# Patient Record
Sex: FEMALE | Race: WHITE | NOT HISPANIC OR LATINO | URBAN - NONMETROPOLITAN AREA
[De-identification: names, ages, dates, MRNs, and addresses within clinical notes are randomized per-mention and may not be internally consistent; named-entity substitution may affect disease eponyms.]

---

## 2018-06-08 ENCOUNTER — OFFICE VISIT (OUTPATIENT)
Dept: URBAN - NONMETROPOLITAN AREA CLINIC 10 | Facility: CLINIC | Age: 68
End: 2018-06-08
Payer: COMMERCIAL

## 2018-06-08 DIAGNOSIS — H25.13 AGE-RELATED NUCLEAR CATARACT, BILATERAL: ICD-10-CM

## 2018-06-08 DIAGNOSIS — H52.223 REGULAR ASTIGMATISM, BILATERAL: ICD-10-CM

## 2018-06-08 PROCEDURE — 92014 COMPRE OPH EXAM EST PT 1/>: CPT | Performed by: OPTOMETRIST

## 2018-06-08 ASSESSMENT — KERATOMETRY
OS: 43.88
OD: 44.00

## 2018-06-08 ASSESSMENT — VISUAL ACUITY
OD: 20/30
OS: 20/30

## 2018-06-08 ASSESSMENT — INTRAOCULAR PRESSURE
OS: 16
OD: 17

## 2018-06-08 NOTE — IMPRESSION/PLAN
Impression: Type 2 diabetes mellitus w/o complication: D55.7. Plan: Diabetes type II: no background retinopathy, no signs of neovascularization noted. Discussed ocular and systemic benefits of blood sugar control.

## 2018-06-08 NOTE — IMPRESSION/PLAN
Impression: Regular astigmatism, bilateral: H52.223. Plan: Diagnosis discussed. SRx released, pt edu that a temporary adjustment is expected. Continue to monitor.

## 2019-06-28 ENCOUNTER — OFFICE VISIT (OUTPATIENT)
Dept: URBAN - NONMETROPOLITAN AREA CLINIC 10 | Facility: CLINIC | Age: 69
End: 2019-06-28
Payer: COMMERCIAL

## 2019-06-28 PROCEDURE — 92014 COMPRE OPH EXAM EST PT 1/>: CPT | Performed by: OPTOMETRIST

## 2019-06-28 RX ORDER — HYDROCHLOROTHIAZIDE 25 MG/1
25 MG TABLET ORAL
Qty: 0 | Refills: 0 | Status: ACTIVE
Start: 2019-06-28

## 2019-06-28 ASSESSMENT — INTRAOCULAR PRESSURE
OS: 16
OD: 16

## 2019-06-28 NOTE — IMPRESSION/PLAN
Impression: Type 2 diabetes mellitus w/o complication: D72.7. Plan: Diabetes type II: no background retinopathy, no signs of neovascularization noted. Discussed ocular and systemic benefits of blood sugar control.

## 2019-07-01 ENCOUNTER — OFFICE VISIT (OUTPATIENT)
Dept: URBAN - NONMETROPOLITAN AREA CLINIC 10 | Facility: CLINIC | Age: 69
End: 2019-07-01
Payer: COMMERCIAL

## 2019-07-01 DIAGNOSIS — E11.9 TYPE 2 DIABETES MELLITUS W/O COMPLICATION: ICD-10-CM

## 2019-07-01 PROCEDURE — 92012 INTRM OPH EXAM EST PATIENT: CPT | Performed by: OPTOMETRIST

## 2019-07-01 ASSESSMENT — VISUAL ACUITY
OD: 20/40
OS: 20/40

## 2019-07-01 ASSESSMENT — KERATOMETRY
OD: 44.13
OS: 44.13

## 2019-07-01 ASSESSMENT — INTRAOCULAR PRESSURE
OD: 16
OS: 17

## 2019-07-01 NOTE — IMPRESSION/PLAN
Impression: Age-related nuclear cataract, bilateral: H25.13. Plan: Discussed diagnosis in detail with patient. No treatment is required at this time. Will continue to observe condition and or symptoms. Call if [address] worsens.

## 2020-07-13 ENCOUNTER — OFFICE VISIT (OUTPATIENT)
Dept: URBAN - NONMETROPOLITAN AREA CLINIC 10 | Facility: CLINIC | Age: 70
End: 2020-07-13
Payer: COMMERCIAL

## 2020-07-13 PROCEDURE — 99213 OFFICE O/P EST LOW 20 MIN: CPT | Performed by: OPTOMETRIST

## 2020-07-13 ASSESSMENT — KERATOMETRY
OS: 44.13
OD: 44.25

## 2020-07-13 ASSESSMENT — INTRAOCULAR PRESSURE
OS: 17
OD: 17

## 2020-07-13 ASSESSMENT — VISUAL ACUITY
OS: 20/40
OD: 20/30

## 2021-07-01 ENCOUNTER — OFFICE VISIT (OUTPATIENT)
Dept: URBAN - NONMETROPOLITAN AREA CLINIC 10 | Facility: CLINIC | Age: 71
End: 2021-07-01
Payer: COMMERCIAL

## 2021-07-01 PROCEDURE — 99204 OFFICE O/P NEW MOD 45 MIN: CPT | Performed by: OPHTHALMOLOGY

## 2021-07-01 ASSESSMENT — INTRAOCULAR PRESSURE
OD: 18
OS: 18

## 2021-07-01 ASSESSMENT — VISUAL ACUITY
OD: 20/40
OS: 20/40

## 2021-07-01 ASSESSMENT — KERATOMETRY
OD: 44.00
OS: 44.63

## 2021-07-01 NOTE — IMPRESSION/PLAN
Impression: Age-related nuclear cataract, bilateral: H25.13. Plan: Cataracts account for the patient's complaints. Surgery is recommended but patient is not noticing any vision changes. Patient is asymptomatic at this time. Patient may need to do surgery to see view of retina. The patient will monitor vision changes and contact us with any decrease in vision.

## 2021-07-01 NOTE — IMPRESSION/PLAN
Impression: Type 2 diabetes mellitus w/o complication: E70.6. Plan: Diabetes type II: no background retinopathy, no signs of neovascularization noted. Discussed ocular and systemic benefits of blood sugar control.

## 2022-03-03 ENCOUNTER — OFFICE VISIT (OUTPATIENT)
Dept: URBAN - NONMETROPOLITAN AREA CLINIC 10 | Facility: CLINIC | Age: 72
End: 2022-03-03
Payer: COMMERCIAL

## 2022-03-03 DIAGNOSIS — H43.813 VITREOUS DEGENERATION, BILATERAL: ICD-10-CM

## 2022-03-03 PROCEDURE — 92014 COMPRE OPH EXAM EST PT 1/>: CPT | Performed by: STUDENT IN AN ORGANIZED HEALTH CARE EDUCATION/TRAINING PROGRAM

## 2022-03-03 ASSESSMENT — INTRAOCULAR PRESSURE
OS: 19
OD: 19

## 2022-03-03 NOTE — IMPRESSION/PLAN
Impression: Age-related nuclear cataract, bilateral: H25.13. Plan: Patient educated on cataract as cause of reduced vision. Discussed surgical intervention versus monitoring with patient. Discussed that new glasses Rx will help only minimally with vision. Patient defers surgery at this time. Patient may call if vision decreases or changes significantly. Patient may also call for cataract consultation if they decide to elect surgical intervention. Will plan on monitoring semi-annually Pt ed extensively that she is not to drive at night without correction as she does not meet 20/40 benchmark.

## 2022-03-03 NOTE — IMPRESSION/PLAN
Impression: Type 2 diabetes mellitus without complications: N77.1. Plan: No background diabetic retinopathy, no signs of neovascularization noted. Counseled patient regarding ocular and systemic benefits of blood sugar control. Note to be sent to patient's PCP detailing findings.

## 2022-09-16 ENCOUNTER — OFFICE VISIT (OUTPATIENT)
Dept: URBAN - NONMETROPOLITAN AREA CLINIC 10 | Facility: CLINIC | Age: 72
End: 2022-09-16
Payer: COMMERCIAL

## 2022-09-16 DIAGNOSIS — H25.13 AGE-RELATED NUCLEAR CATARACT, BILATERAL: Primary | ICD-10-CM

## 2022-09-16 DIAGNOSIS — E11.3291 DIABETES MELLITUS TYPE 2 WITH MILD NON-PROLIFERATIVE RETINOPATHY WITHOUT MACULAR EDEMA, RIGHT EYE: ICD-10-CM

## 2022-09-16 DIAGNOSIS — E11.9 TYPE 2 DIABETES MELLITUS W/O COMPLICATION: ICD-10-CM

## 2022-09-16 PROCEDURE — 99214 OFFICE O/P EST MOD 30 MIN: CPT | Performed by: OPHTHALMOLOGY

## 2022-09-16 ASSESSMENT — INTRAOCULAR PRESSURE
OD: 18
OS: 18

## 2022-09-16 NOTE — IMPRESSION/PLAN
Impression: Diabetes mellitus Type 2 with mild non-proliferative retinopathy without macular edema, right eye: Q03.8591. Plan: Diabetes type II: mild background diabetic retinopathy, no signs of neovascularization noted. No treatment necessary at this time. Patient was instructed to monitor vision for sudden changes and to call if visual changes noted. Discussed ocular and systemic benefits of blood sugar control.

## 2022-09-16 NOTE — IMPRESSION/PLAN
Impression: Type 2 diabetes mellitus w/o complication: H84.6.,WJNI eye Plan: Diabetes type II: no background retinopathy, no signs of neovascularization noted. Discussed ocular and systemic benefits of blood sugar control.

## 2022-10-17 ENCOUNTER — TESTING ONLY (OUTPATIENT)
Dept: URBAN - NONMETROPOLITAN AREA CLINIC 6 | Facility: CLINIC | Age: 72
End: 2022-10-17
Payer: COMMERCIAL

## 2022-10-17 DIAGNOSIS — H25.13 AGE-RELATED NUCLEAR CATARACT, BILATERAL: Primary | ICD-10-CM

## 2022-10-17 ASSESSMENT — PACHYMETRY
OS: 22.68
OD: 22.57

## 2022-11-02 ENCOUNTER — SURGERY (OUTPATIENT)
Dept: URBAN - NONMETROPOLITAN AREA SURGERY 1 | Facility: SURGERY | Age: 72
End: 2022-11-02
Payer: COMMERCIAL

## 2022-11-02 DIAGNOSIS — H57.03 MIOSIS: Primary | ICD-10-CM

## 2022-11-02 DIAGNOSIS — H25.13 AGE-RELATED NUCLEAR CATARACT, BILATERAL: ICD-10-CM

## 2022-11-02 PROCEDURE — 66982 XCAPSL CTRC RMVL CPLX WO ECP: CPT | Performed by: OPHTHALMOLOGY

## 2022-11-03 ENCOUNTER — POST-OPERATIVE VISIT (OUTPATIENT)
Dept: URBAN - NONMETROPOLITAN AREA CLINIC 10 | Facility: CLINIC | Age: 72
End: 2022-11-03
Payer: COMMERCIAL

## 2022-11-03 DIAGNOSIS — Z48.810 ENCOUNTER FOR SURGICAL AFTERCARE FOLLOWING SURGERY ON A SENSE ORGAN: Primary | ICD-10-CM

## 2022-11-03 PROCEDURE — 99024 POSTOP FOLLOW-UP VISIT: CPT | Performed by: STUDENT IN AN ORGANIZED HEALTH CARE EDUCATION/TRAINING PROGRAM

## 2022-11-03 ASSESSMENT — INTRAOCULAR PRESSURE
OS: 18
OD: 16

## 2022-11-03 NOTE — IMPRESSION/PLAN
Impression: S/P Cataract Extraction by phacoemulsification with IOL placement OS - 1 Day. Encounter for surgical aftercare following surgery on a sense organ  Z48.810. Excellent post op course   Post operative instructions reviewed - Condition is improving - Plan: --Advised patient to use artificial tears for comfort.

## 2022-11-10 ENCOUNTER — POST-OPERATIVE VISIT (OUTPATIENT)
Dept: URBAN - NONMETROPOLITAN AREA CLINIC 10 | Facility: CLINIC | Age: 72
End: 2022-11-10
Payer: COMMERCIAL

## 2022-11-10 DIAGNOSIS — Z48.810 ENCOUNTER FOR SURGICAL AFTERCARE FOLLOWING SURGERY ON A SENSE ORGAN: Primary | ICD-10-CM

## 2022-11-10 PROCEDURE — 99024 POSTOP FOLLOW-UP VISIT: CPT | Performed by: STUDENT IN AN ORGANIZED HEALTH CARE EDUCATION/TRAINING PROGRAM

## 2022-11-10 ASSESSMENT — INTRAOCULAR PRESSURE
OD: 20
OS: 18

## 2022-11-10 ASSESSMENT — VISUAL ACUITY
OS: 20/20
OD: 20/30

## 2022-11-10 NOTE — IMPRESSION/PLAN
Impression: S/P Cataract Extraction by phacoemulsification with IOL placement OS - 8 Days. Encounter for surgical aftercare following surgery on a sense organ  Z48.810. Excellent post op course   Post operative instructions reviewed - Condition is improving - Plan: Good clinical course. Use ATs prn for comfort. --Advised patient to use artificial tears for comfort.

## 2022-11-16 ENCOUNTER — SURGERY (OUTPATIENT)
Dept: URBAN - NONMETROPOLITAN AREA SURGERY 1 | Facility: SURGERY | Age: 72
End: 2022-11-16
Payer: COMMERCIAL

## 2022-11-16 DIAGNOSIS — H25.11 AGE-RELATED NUCLEAR CATARACT, RIGHT EYE: Primary | ICD-10-CM

## 2022-11-16 PROCEDURE — 66984 XCAPSL CTRC RMVL W/O ECP: CPT | Performed by: OPHTHALMOLOGY

## 2022-11-17 ENCOUNTER — POST-OPERATIVE VISIT (OUTPATIENT)
Dept: URBAN - NONMETROPOLITAN AREA CLINIC 6 | Facility: CLINIC | Age: 72
End: 2022-11-17
Payer: COMMERCIAL

## 2022-11-17 DIAGNOSIS — Z96.1 PRESENCE OF INTRAOCULAR LENS: Primary | ICD-10-CM

## 2022-11-17 PROCEDURE — 99024 POSTOP FOLLOW-UP VISIT: CPT | Performed by: OPHTHALMOLOGY

## 2022-11-17 ASSESSMENT — INTRAOCULAR PRESSURE
OD: 18
OS: 17

## 2022-11-17 NOTE — IMPRESSION/PLAN
Impression: S/P Cataract Extraction by phacoemulsification with IOL placement OD - 1 Day. Presence of intraocular lens  Z96.1. Post operative instructions reviewed - Condition is improving - Plan: Yanna Jeter OD--Advised patient to use artificial tears for comfort.

## 2022-12-01 ENCOUNTER — POST-OPERATIVE VISIT (OUTPATIENT)
Dept: URBAN - NONMETROPOLITAN AREA CLINIC 10 | Facility: CLINIC | Age: 72
End: 2022-12-01
Payer: COMMERCIAL

## 2022-12-01 DIAGNOSIS — Z48.810 ENCOUNTER FOR SURGICAL AFTERCARE FOLLOWING SURGERY ON A SENSE ORGAN: Primary | ICD-10-CM

## 2022-12-01 PROCEDURE — 99024 POSTOP FOLLOW-UP VISIT: CPT | Performed by: STUDENT IN AN ORGANIZED HEALTH CARE EDUCATION/TRAINING PROGRAM

## 2022-12-01 RX ORDER — PREDNISOLONE ACETATE 10 MG/ML
1 % SUSPENSION/ DROPS OPHTHALMIC
Qty: 5 | Refills: 0 | Status: ACTIVE
Start: 2022-12-01

## 2022-12-01 ASSESSMENT — INTRAOCULAR PRESSURE
OS: 18
OD: 17

## 2022-12-01 NOTE — IMPRESSION/PLAN
Impression:  Encounter for surgical aftercare following surgery on a sense organ  Z48.810. Plan: pred 4 x day for 1 week then BID for 1 week.

## 2022-12-23 ENCOUNTER — POST-OPERATIVE VISIT (OUTPATIENT)
Dept: URBAN - NONMETROPOLITAN AREA CLINIC 10 | Facility: CLINIC | Age: 72
End: 2022-12-23
Payer: COMMERCIAL

## 2022-12-23 DIAGNOSIS — Z96.1 PRESENCE OF INTRAOCULAR LENS: Primary | ICD-10-CM

## 2022-12-23 PROCEDURE — 99024 POSTOP FOLLOW-UP VISIT: CPT | Performed by: STUDENT IN AN ORGANIZED HEALTH CARE EDUCATION/TRAINING PROGRAM

## 2022-12-23 ASSESSMENT — INTRAOCULAR PRESSURE
OD: 17
OS: 17

## 2022-12-23 NOTE — IMPRESSION/PLAN
Impression:  Presence of intraocular lens  Z96.1. Excellent post op course   Post operative instructions reviewed - Condition is improving - Plan: Good clinical course. Use ATs prn for comfort. Declines glasses Rx. D/c Pred. --Advised patient to use artificial tears for comfort.

## 2023-03-29 ENCOUNTER — OFFICE VISIT (OUTPATIENT)
Dept: URBAN - NONMETROPOLITAN AREA CLINIC 10 | Facility: CLINIC | Age: 73
End: 2023-03-29
Payer: COMMERCIAL

## 2023-03-29 DIAGNOSIS — H26.491 OTHER SECONDARY CATARACT, RIGHT EYE: ICD-10-CM

## 2023-03-29 DIAGNOSIS — E11.9 TYPE 2 DIABETES MELLITUS WITHOUT COMPLICATIONS: Primary | ICD-10-CM

## 2023-03-29 DIAGNOSIS — H35.033 HYPERTENSIVE RETINOPATHY, BILATERAL: ICD-10-CM

## 2023-03-29 PROCEDURE — 92134 CPTRZ OPH DX IMG PST SGM RTA: CPT | Performed by: STUDENT IN AN ORGANIZED HEALTH CARE EDUCATION/TRAINING PROGRAM

## 2023-03-29 PROCEDURE — 92014 COMPRE OPH EXAM EST PT 1/>: CPT | Performed by: STUDENT IN AN ORGANIZED HEALTH CARE EDUCATION/TRAINING PROGRAM

## 2023-03-29 ASSESSMENT — INTRAOCULAR PRESSURE
OD: 15
OS: 15

## 2023-03-29 NOTE — IMPRESSION/PLAN
Impression: Type 2 diabetes mellitus without complications: K02.1. Plan: No background diabetic retinopathy, no signs of neovascularization noted. Counseled patient regarding ocular and systemic benefits of blood sugar control. Note to be sent to patient's PCP detailing findings.

## 2023-03-29 NOTE — IMPRESSION/PLAN
Impression: Other secondary cataract, right eye: H26.491. Plan: Patient counseled on findings. No treatment currently recommended due to visual acuity function level. Patient will monitor vision changes and contact us with any decrease in vision, will re-evaluate cataract on return visit.

## 2024-01-31 NOTE — IMPRESSION/PLAN
Impression: Age-related nuclear cataract, bilateral: H25.13. Plan: Discussed diagnosis in detail with patient. No treatment is required at this time. Will continue to observe condition and or symptoms. Call if [place] worsens.
Impression: Type 2 diabetes mellitus w/o complication: A45.3. Plan: Diabetes type II: no background retinopathy, no signs of neovascularization noted. Discussed ocular and systemic benefits of blood sugar control.
Impression: Vitreous degeneration, bilateral: H43.813. Plan: Discussed diagnosis with Pt. There is no evidence of Retinal Pathology. Discussed S/S of RD. Pt to RTC if they occur.
Clothing